# Patient Record
(demographics unavailable — no encounter records)

---

## 2025-07-01 NOTE — HISTORY OF PRESENT ILLNESS
[Arms] : arms [Legs] : legs [Abdomen] : abdomen [7] :  blood sugar levels are tested 7 times per day [_____ times per night] : [unfilled] time(s) per night [FreeTextEntry2] : Lu is a 6 year 11 month old female with recently diagnosed type 1 DM.  She presented to ED on 06/03/2025 in severe DKA with nausea, vomiting, abdominal pain, lethargy, and trouble breathing.  Family reported polyuria and polydipsia over the prior week with recent weight loss. She was admitted to PICU for insulin management and monitoring. Paternal great-grandmother had diabetes when older. Lu has 3 siblings (22 y/oM, 7 y/oF, 5 y/o M) who are all healthy. pH of 12%, POCT glucose >600 mg/dL, A1c 12.0%. Sugars were in 200s by time of discharge. She was discharged on 5U Lantus nightly with Humalog for meal bolus and correction. She has not contacted our office since discharge to make adjustments to insulin.   Today, mother voiced that she has been generally well although still wakes nightly to urinate. Has been taking only 1U glargine nightly instead of the 5U glargine prescribed. Mother reports that she eats a balanced diet and drinks water, but she has been limiting the carbohydrates as her glucoses have been so high. She has an initial outpatient appointment scheduled with [facility] on 07/02. They have been carb counting. Lu's last 2 DexCom G7 sensor failed. They will get replacement by DexCom.    Mother wants patient's brother and sister tested for T1DM, although currently asymptomatic.  [Premenarchal] : premenarchal

## 2025-07-01 NOTE — HISTORY OF PRESENT ILLNESS
[Arms] : arms [Legs] : legs [Abdomen] : abdomen [7] :  blood sugar levels are tested 7 times per day [_____ times per night] : [unfilled] time(s) per night [FreeTextEntry2] : Lu is a 6 year 11 month old female with recently diagnosed type 1 DM.  She presented to ED on 06/03/2025 in severe DKA with nausea, vomiting, abdominal pain, lethargy, and trouble breathing.  Family reported polyuria and polydipsia over the prior week with recent weight loss. She was admitted to PICU for insulin management and monitoring. Paternal great-grandmother had diabetes when older. Lu has 3 siblings (22 y/oM, 7 y/oF, 3 y/o M) who are all healthy. pH of 12%, POCT glucose >600 mg/dL, A1c 12.0%. Sugars were in 200s by time of discharge. She was discharged on 5U Lantus nightly with Humalog for meal bolus and correction. She has not contacted our office since discharge to make adjustments to insulin.   Today, mother voiced that she has been generally well although still wakes nightly to urinate. Has been taking only 1U glargine nightly instead of the 5U glargine prescribed. Mother reports that she eats a balanced diet and drinks water, but she has been limiting the carbohydrates as her glucoses have been so high. She has an initial outpatient appointment scheduled with [facility] on 07/02. They have been carb counting. Lu's last 2 DexCom G7 sensor failed. They will get replacement by DexCom.    Mother wants patient's brother and sister tested for T1DM, although currently asymptomatic.  [Premenarchal] : premenarchal

## 2025-07-01 NOTE — CONSULT LETTER
[Dear  ___] : Dear  [unfilled], [Courtesy Letter:] : I had the pleasure of seeing your patient, [unfilled], in my office today. [Please see my note below.] : Please see my note below. [Consult Closing:] : Thank you very much for allowing me to participate in the care of this patient.  If you have any questions, please do not hesitate to contact me. [Sincerely,] : Sincerely, [FreeTextEntry3] : YeouChing Hsu, MD Division of Pediatric Endocrinology [facility]  of Pediatric[facility] [employer] [facility] at [facility], [place]

## 2025-07-01 NOTE — CONSULT LETTER
[Dear  ___] : Dear  [unfilled], [Courtesy Letter:] : I had the pleasure of seeing your patient, [unfilled], in my office today. [Please see my note below.] : Please see my note below. [Consult Closing:] : Thank you very much for allowing me to participate in the care of this patient.  If you have any questions, please do not hesitate to contact me. [Sincerely,] : Sincerely, [FreeTextEntry3] : YeouChing Hsu, MD Division of Pediatric Endocrinology [facility]  of Pediatrics [facility] at [facility]

## 2025-07-01 NOTE — THERAPY
[Today's Date] : [unfilled] [Other:___] : [unfilled] [___] : [unfilled] units of insulin pre-bedtime [Carbohydrate Ratio:                  1 unit for every ___ grams of carbohydrates] : Carbohydrate Ratio: 1 unit for every [unfilled] grams of carbohydrates [BG Target = ____] : BG Target = [unfilled] [Insulin Sensitivity Factor = ____] : Insulin Sensitivity Factor = [unfilled] [Insulin on Board (IOB) Duration = ____ hours] : Insulin on Board (IOB) Duration  = [unfilled] hours [FreeTextEntry5] : Glargine

## 2025-07-01 NOTE — DISCUSSION/SUMMARY
[FreeTextEntry1] : Lu is a 6 year old female with no PMHx with new onset type 1 DM. She has been generally well since starting insulin therapy with persistent polyuria.   Diabetes is a serious chronic disease that impairs the body's ability to use food for energy. The goal of effective diabetes management is to control blood glucose levels by keeping them within a target range which is determined for each child on an individual basis. Optimal blood glucose control helps to promote normal growth and development. Effective diabetes management is needed on an ongoing daily basis to prevent the immediate dangers of hypoglycemia and the long-term complications than can be delayed by preventing extended periods of hyperglycemia. The key to optimal blood glucose control is to carefully balance food, exercise, and insulin or medication. Dexcom from dates 6/13-6/26 shows average glucose=329 mg/dl with 6% >180 mg/dl; 94% >250 mg/dl with GMI=11.2%. Glucose values are elevated throughout the 24 hours.  We discussed administering the prescribed lantus dose of 5U nightly and the importance of monitoring blood sugars. We reviewed symptoms of both hyperglycemia and hypoglycemia and how to manage these cases. Counting carbohydrates and pre-meal Humalog were reviewed, as well as how to correct for hyperglycemia.  We also discussed type 1 DM testing for patient's brother and sister. Mother expressed understanding and knows to reach out to office if sugars are running consistently high or low.    Addendum by Dr. Concepcion: Patient seen and examined in office, plan discussed with family and resident and nurse. Note edited and addended accordingly. It was already reviewed with mother to contact office regularly to make adjustments. Reviewed it is important to not limit carbohydrates in a growing child she has likely lost weight from dx need to regain weight.  For DM testing, I reviewed that for a long period of time antibodies have been obtained and natural hx of type 1 diabetes has been studied through [facility]. It has not been possible to give any treatment previously. FDA has approved Tzield (teplizumab) an Anti CD3 monoclonal antibodies for patients in stage 2 of type 1 diabetes mellitus. Stage 2 is pre-clinical (mildly hyperglycemic) stage of type 1 diabetes. The treatment allows one to stay in stage 2 for longer but does not prevent type 1 diabetes from occurring. It is not without risk, and the infusion is 14 consecutive days, but with treatment one can stay in preclinical type 1 DM for much longer period of time. Family is interested given letter for PCP to obtain A1c, glucise, and type 1 DM antibodies.